# Patient Record
Sex: MALE | Race: WHITE | NOT HISPANIC OR LATINO | URBAN - METROPOLITAN AREA
[De-identification: names, ages, dates, MRNs, and addresses within clinical notes are randomized per-mention and may not be internally consistent; named-entity substitution may affect disease eponyms.]

---

## 2022-01-01 ENCOUNTER — INPATIENT (INPATIENT)
Facility: HOSPITAL | Age: 82
LOS: 0 days | DRG: 951 | End: 2022-11-06
Attending: STUDENT IN AN ORGANIZED HEALTH CARE EDUCATION/TRAINING PROGRAM | Admitting: STUDENT IN AN ORGANIZED HEALTH CARE EDUCATION/TRAINING PROGRAM
Payer: MEDICARE

## 2022-01-01 VITALS
OXYGEN SATURATION: 92 % | DIASTOLIC BLOOD PRESSURE: 66 MMHG | RESPIRATION RATE: 19 BRPM | HEART RATE: 58 BPM | TEMPERATURE: 99 F | SYSTOLIC BLOOD PRESSURE: 127 MMHG

## 2022-01-01 VITALS
HEIGHT: 67 IN | TEMPERATURE: 98 F | SYSTOLIC BLOOD PRESSURE: 121 MMHG | DIASTOLIC BLOOD PRESSURE: 89 MMHG | HEART RATE: 79 BPM

## 2022-01-01 DIAGNOSIS — N17.9 ACUTE KIDNEY FAILURE, UNSPECIFIED: ICD-10-CM

## 2022-01-01 DIAGNOSIS — I50.23 ACUTE ON CHRONIC SYSTOLIC (CONGESTIVE) HEART FAILURE: ICD-10-CM

## 2022-01-01 DIAGNOSIS — G93.41 METABOLIC ENCEPHALOPATHY: ICD-10-CM

## 2022-01-01 DIAGNOSIS — D62 ACUTE POSTHEMORRHAGIC ANEMIA: ICD-10-CM

## 2022-01-01 DIAGNOSIS — E87.20 ACIDOSIS, UNSPECIFIED: ICD-10-CM

## 2022-01-01 DIAGNOSIS — R77.8 OTHER SPECIFIED ABNORMALITIES OF PLASMA PROTEINS: ICD-10-CM

## 2022-01-01 DIAGNOSIS — Z29.9 ENCOUNTER FOR PROPHYLACTIC MEASURES, UNSPECIFIED: ICD-10-CM

## 2022-01-01 DIAGNOSIS — I50.22 CHRONIC SYSTOLIC (CONGESTIVE) HEART FAILURE: ICD-10-CM

## 2022-01-01 LAB
ABO RH CONFIRMATION: SIGNIFICANT CHANGE UP
ALBUMIN SERPL ELPH-MCNC: 3 G/DL — LOW (ref 3.3–5)
ALP SERPL-CCNC: 61 U/L — SIGNIFICANT CHANGE UP (ref 40–120)
ALT FLD-CCNC: 115 U/L — HIGH (ref 12–78)
ANION GAP SERPL CALC-SCNC: 18 MMOL/L — HIGH (ref 5–17)
APPEARANCE UR: ABNORMAL
APTT BLD: 31.8 SEC — SIGNIFICANT CHANGE UP (ref 27.5–35.5)
AST SERPL-CCNC: 130 U/L — HIGH (ref 15–37)
BASE EXCESS BLDA CALC-SCNC: -10.7 MMOL/L — LOW (ref -2–3)
BASOPHILS # BLD AUTO: 0 K/UL — SIGNIFICANT CHANGE UP (ref 0–0.2)
BASOPHILS NFR BLD AUTO: 0 % — SIGNIFICANT CHANGE UP (ref 0–2)
BILIRUB SERPL-MCNC: 0.9 MG/DL — SIGNIFICANT CHANGE UP (ref 0.2–1.2)
BILIRUB UR-MCNC: NEGATIVE — SIGNIFICANT CHANGE UP
BLOOD GAS COMMENTS ARTERIAL: SIGNIFICANT CHANGE UP
BUN SERPL-MCNC: 161 MG/DL — HIGH (ref 7–23)
CALCIUM SERPL-MCNC: 8.7 MG/DL — SIGNIFICANT CHANGE UP (ref 8.5–10.1)
CHLORIDE SERPL-SCNC: 101 MMOL/L — SIGNIFICANT CHANGE UP (ref 96–108)
CO2 SERPL-SCNC: 17 MMOL/L — LOW (ref 22–31)
COLOR SPEC: YELLOW — SIGNIFICANT CHANGE UP
CREAT SERPL-MCNC: 3.2 MG/DL — HIGH (ref 0.5–1.3)
DIFF PNL FLD: ABNORMAL
EGFR: 18 ML/MIN/1.73M2 — LOW
EOSINOPHIL # BLD AUTO: 0 K/UL — SIGNIFICANT CHANGE UP (ref 0–0.5)
EOSINOPHIL NFR BLD AUTO: 0 % — SIGNIFICANT CHANGE UP (ref 0–6)
GLUCOSE SERPL-MCNC: 99 MG/DL — SIGNIFICANT CHANGE UP (ref 70–99)
GLUCOSE UR QL: NEGATIVE — SIGNIFICANT CHANGE UP
HCO3 BLDA-SCNC: 15 MMOL/L — LOW (ref 21–28)
HCT VFR BLD CALC: 23.4 % — LOW (ref 39–50)
HGB BLD-MCNC: 7.4 G/DL — LOW (ref 13–17)
IMM GRANULOCYTES NFR BLD AUTO: 0.2 % — SIGNIFICANT CHANGE UP (ref 0–0.9)
INR BLD: 2.16 RATIO — HIGH (ref 0.88–1.16)
KETONES UR-MCNC: NEGATIVE — SIGNIFICANT CHANGE UP
LACTATE SERPL-SCNC: 8.2 MMOL/L — CRITICAL HIGH (ref 0.7–2)
LEUKOCYTE ESTERASE UR-ACNC: ABNORMAL
LYMPHOCYTES # BLD AUTO: 0.85 K/UL — LOW (ref 1–3.3)
LYMPHOCYTES # BLD AUTO: 17.7 % — SIGNIFICANT CHANGE UP (ref 13–44)
MCHC RBC-ENTMCNC: 31.6 GM/DL — LOW (ref 32–36)
MCHC RBC-ENTMCNC: 31.6 PG — SIGNIFICANT CHANGE UP (ref 27–34)
MCV RBC AUTO: 100 FL — SIGNIFICANT CHANGE UP (ref 80–100)
MONOCYTES # BLD AUTO: 0.11 K/UL — SIGNIFICANT CHANGE UP (ref 0–0.9)
MONOCYTES NFR BLD AUTO: 2.3 % — SIGNIFICANT CHANGE UP (ref 2–14)
NEUTROPHILS # BLD AUTO: 3.83 K/UL — SIGNIFICANT CHANGE UP (ref 1.8–7.4)
NEUTROPHILS NFR BLD AUTO: 79.8 % — HIGH (ref 43–77)
NITRITE UR-MCNC: NEGATIVE — SIGNIFICANT CHANGE UP
NRBC # BLD: 0 /100 WBCS — SIGNIFICANT CHANGE UP (ref 0–0)
NT-PROBNP SERPL-SCNC: HIGH PG/ML (ref 0–450)
OB PNL STL: POSITIVE
PCO2 BLDA: 27 MMHG — LOW (ref 35–48)
PH BLDA: 7.35 — SIGNIFICANT CHANGE UP (ref 7.35–7.45)
PH UR: 5 — SIGNIFICANT CHANGE UP (ref 5–8)
PLATELET # BLD AUTO: 213 K/UL — SIGNIFICANT CHANGE UP (ref 150–400)
PO2 BLDA: 117 MMHG — HIGH (ref 83–108)
POTASSIUM SERPL-MCNC: 5 MMOL/L — SIGNIFICANT CHANGE UP (ref 3.5–5.3)
POTASSIUM SERPL-SCNC: 5 MMOL/L — SIGNIFICANT CHANGE UP (ref 3.5–5.3)
PROT SERPL-MCNC: 6.6 G/DL — SIGNIFICANT CHANGE UP (ref 6–8.3)
PROT UR-MCNC: 100
PROTHROM AB SERPL-ACNC: 25.5 SEC — HIGH (ref 10.5–13.4)
RBC # BLD: 2.34 M/UL — LOW (ref 4.2–5.8)
RBC # FLD: 15.4 % — HIGH (ref 10.3–14.5)
SAO2 % BLDA: 100 % — HIGH (ref 94–98)
SARS-COV-2 RNA SPEC QL NAA+PROBE: SIGNIFICANT CHANGE UP
SODIUM SERPL-SCNC: 136 MMOL/L — SIGNIFICANT CHANGE UP (ref 135–145)
SP GR SPEC: 1.01 — SIGNIFICANT CHANGE UP (ref 1.01–1.02)
TROPONIN I, HIGH SENSITIVITY RESULT: 549.8 NG/L — HIGH
UROBILINOGEN FLD QL: NEGATIVE — SIGNIFICANT CHANGE UP
WBC # BLD: 4.8 K/UL — SIGNIFICANT CHANGE UP (ref 3.8–10.5)
WBC # FLD AUTO: 4.8 K/UL — SIGNIFICANT CHANGE UP (ref 3.8–10.5)

## 2022-01-01 PROCEDURE — 81001 URINALYSIS AUTO W/SCOPE: CPT

## 2022-01-01 PROCEDURE — 93010 ELECTROCARDIOGRAM REPORT: CPT

## 2022-01-01 PROCEDURE — 71250 CT THORAX DX C-: CPT | Mod: 26,MA

## 2022-01-01 PROCEDURE — 85025 COMPLETE CBC W/AUTO DIFF WBC: CPT

## 2022-01-01 PROCEDURE — 86900 BLOOD TYPING SEROLOGIC ABO: CPT

## 2022-01-01 PROCEDURE — 70450 CT HEAD/BRAIN W/O DYE: CPT | Mod: 26,MA

## 2022-01-01 PROCEDURE — 99497 ADVNCD CARE PLAN 30 MIN: CPT | Mod: 25

## 2022-01-01 PROCEDURE — 76770 US EXAM ABDO BACK WALL COMP: CPT | Mod: 26

## 2022-01-01 PROCEDURE — 87635 SARS-COV-2 COVID-19 AMP PRB: CPT

## 2022-01-01 PROCEDURE — 85610 PROTHROMBIN TIME: CPT

## 2022-01-01 PROCEDURE — 93005 ELECTROCARDIOGRAM TRACING: CPT

## 2022-01-01 PROCEDURE — 83880 ASSAY OF NATRIURETIC PEPTIDE: CPT

## 2022-01-01 PROCEDURE — 85730 THROMBOPLASTIN TIME PARTIAL: CPT

## 2022-01-01 PROCEDURE — 70450 CT HEAD/BRAIN W/O DYE: CPT | Mod: MA

## 2022-01-01 PROCEDURE — 99291 CRITICAL CARE FIRST HOUR: CPT | Mod: 25

## 2022-01-01 PROCEDURE — 76770 US EXAM ABDO BACK WALL COMP: CPT

## 2022-01-01 PROCEDURE — 71045 X-RAY EXAM CHEST 1 VIEW: CPT | Mod: 26

## 2022-01-01 PROCEDURE — 99223 1ST HOSP IP/OBS HIGH 75: CPT | Mod: AI

## 2022-01-01 PROCEDURE — 74176 CT ABD & PELVIS W/O CONTRAST: CPT | Mod: MA

## 2022-01-01 PROCEDURE — 36415 COLL VENOUS BLD VENIPUNCTURE: CPT

## 2022-01-01 PROCEDURE — 84484 ASSAY OF TROPONIN QUANT: CPT

## 2022-01-01 PROCEDURE — 74176 CT ABD & PELVIS W/O CONTRAST: CPT | Mod: 26,MA

## 2022-01-01 PROCEDURE — 86850 RBC ANTIBODY SCREEN: CPT

## 2022-01-01 PROCEDURE — 82803 BLOOD GASES ANY COMBINATION: CPT

## 2022-01-01 PROCEDURE — 82272 OCCULT BLD FECES 1-3 TESTS: CPT

## 2022-01-01 PROCEDURE — 86901 BLOOD TYPING SEROLOGIC RH(D): CPT

## 2022-01-01 PROCEDURE — 71045 X-RAY EXAM CHEST 1 VIEW: CPT

## 2022-01-01 PROCEDURE — 80053 COMPREHEN METABOLIC PANEL: CPT

## 2022-01-01 PROCEDURE — 87040 BLOOD CULTURE FOR BACTERIA: CPT

## 2022-01-01 PROCEDURE — 83605 ASSAY OF LACTIC ACID: CPT

## 2022-01-01 PROCEDURE — 99291 CRITICAL CARE FIRST HOUR: CPT

## 2022-01-01 PROCEDURE — 71250 CT THORAX DX C-: CPT | Mod: MA

## 2022-01-01 RX ORDER — FONDAPARINUX SODIUM 2.5 MG/.5ML
1 INJECTION, SOLUTION SUBCUTANEOUS
Qty: 0 | Refills: 0 | DISCHARGE

## 2022-01-01 RX ORDER — MORPHINE SULFATE 50 MG/1
5 CAPSULE, EXTENDED RELEASE ORAL
Refills: 0 | Status: DISCONTINUED | OUTPATIENT
Start: 2022-01-01 | End: 2022-01-01

## 2022-01-01 RX ORDER — HYDROMORPHONE HYDROCHLORIDE 2 MG/ML
0.5 INJECTION INTRAMUSCULAR; INTRAVENOUS; SUBCUTANEOUS
Refills: 0 | Status: DISCONTINUED | OUTPATIENT
Start: 2022-01-01 | End: 2022-01-01

## 2022-01-01 RX ORDER — METOPROLOL TARTRATE 50 MG
50 TABLET ORAL
Refills: 0 | Status: DISCONTINUED | OUTPATIENT
Start: 2022-01-01 | End: 2022-01-01

## 2022-01-01 RX ORDER — SODIUM BICARBONATE 1 MEQ/ML
0.05 SYRINGE (ML) INTRAVENOUS
Qty: 75 | Refills: 0 | Status: DISCONTINUED | OUTPATIENT
Start: 2022-01-01 | End: 2022-01-01

## 2022-01-01 RX ORDER — METOPROLOL TARTRATE 50 MG
1 TABLET ORAL
Qty: 0 | Refills: 0 | DISCHARGE

## 2022-01-01 RX ORDER — ONDANSETRON 8 MG/1
4 TABLET, FILM COATED ORAL EVERY 8 HOURS
Refills: 0 | Status: DISCONTINUED | OUTPATIENT
Start: 2022-01-01 | End: 2022-01-01

## 2022-01-01 RX ORDER — LANOLIN ALCOHOL/MO/W.PET/CERES
3 CREAM (GRAM) TOPICAL AT BEDTIME
Refills: 0 | Status: DISCONTINUED | OUTPATIENT
Start: 2022-01-01 | End: 2022-01-01

## 2022-01-01 RX ORDER — ATORVASTATIN CALCIUM 80 MG/1
20 TABLET, FILM COATED ORAL AT BEDTIME
Refills: 0 | Status: DISCONTINUED | OUTPATIENT
Start: 2022-01-01 | End: 2022-01-01

## 2022-01-01 RX ORDER — ATORVASTATIN CALCIUM 80 MG/1
1 TABLET, FILM COATED ORAL
Qty: 0 | Refills: 0 | DISCHARGE

## 2022-01-01 RX ORDER — SODIUM BICARBONATE 1 MEQ/ML
100 SYRINGE (ML) INTRAVENOUS ONCE
Refills: 0 | Status: COMPLETED | OUTPATIENT
Start: 2022-01-01 | End: 2022-01-01

## 2022-01-01 RX ADMIN — Medication 100 MILLIEQUIVALENT(S): at 04:15

## 2022-01-01 RX ADMIN — ONDANSETRON 4 MILLIGRAM(S): 8 TABLET, FILM COATED ORAL at 03:03

## 2022-11-05 NOTE — ED ADULT TRIAGE NOTE - HAVE YOU RECEIVED AT LEAST TWO PFIZER AND/OR MODERNA VACCINATIONS (IN ANY COMBINATION) AND/OR ONE JOHNSON & JOHNSON VACCINATION?
Neuro: A&Ox4. Calls appropriately.   Activity: Up with Ax2 and lift. Q2H repo. Pt sat up in recliner.    Vitals: Afebrile. VSS on 4L, afib 100s-120s.     LDAS: Right DL PICC heparin locked. Left PIV saline locked. Baig catheter.   Cardiac: Afib with frequent PACs/PVCs.    Respiratory: Lung sounds clear, diminished at the bases. 4L O2 via NC, BiPAP at 40% FiO2 when sleeping.  GI/: Baig catheter in place. BM this shift,   Skin:  Interdry and miconazole powder between abdominal skin folds. Preventative mepilex covering coccyx.  Pain: C/o generalized achy pain.   Diet: Regular.   Plan: Continue to monitor and follow POC.       Yes

## 2022-11-05 NOTE — ED PROVIDER NOTE - CARE PLAN
Principal Discharge DX:	Acute on chronic systolic congestive heart failure  Secondary Diagnosis:	Anemia due to acute blood loss   1

## 2022-11-05 NOTE — ED PROVIDER NOTE - PROGRESS NOTE DETAILS
CT images reviewed by me. CT head no acute bleed. CT chest B/L pleural effusions worse on the left (as per family, pt has had this before), CT A/P markedly distended urinary bladder.   Family at bedside informed of prelim findings. RN advised to place farmer cath. Labs pending. Labs reviewed.  Likely acute anemia hemoglobin is 7.4.  Worsening renal function creatinine 3.2 family reports baseline is 2.2.0.  Also elevated troponins of 549.  Markedly elevated lactic acid of 8.0.  Family informed of all results.  Advise unit of blood transfusion.  Family agreed and consented for blood transfusion.  Case discussed with Dr. Aguilar advised ICU consult.  Spoke with ICU PA will evaluate patient.  Family is deciding on making patient a DNR.  We will sign MOLST forms once they have made their decision. Pt made DNI/DNR by family.  ICU advised comfort care.  Case d/w Dr. Aguilar for admisison

## 2022-11-05 NOTE — ED ADULT NURSE NOTE - OBJECTIVE STATEMENT
83 y/o male received from triage. Alert and oriented x4. C/o hypotension, low spo2, and weakness. Respirations even and unlabored. Neuro intact, no facial droop noted.

## 2022-11-05 NOTE — ED ADULT TRIAGE NOTE - CHIEF COMPLAINT QUOTE
Patient from home complaining of hypotension, low sp02, decreased eating/drinking and increased weakness, with recently today black tarry stools as per family with recent fall in bathroom, denies LoC, no noticeable trauma to head. Patient has pacemaker and defib to left chest wall.

## 2022-11-05 NOTE — ED PROVIDER NOTE - NSICDXPASTMEDICALHX_GEN_ALL_CORE_FT
PAST MEDICAL HISTORY:  BPH (Benign Prostatic Hyperplasia)     CAD (coronary artery disease) dx 1992  angioplasty in 1992, angioplasty with 1 stent placed 1995, CABG x 5 2004    Herpes zoster left eye 1/13 resolved    HTN (hypertension)     Hyperlipidemia     Kidney disease

## 2022-11-05 NOTE — ED PROVIDER NOTE - CLINICAL SUMMARY MEDICAL DECISION MAKING FREE TEXT BOX
82-year-old male with history of hypertension, congestive heart failure, CAD, AICD presents for increasing lower extremity swelling patient becoming more somnolent and diminished appetite along with black tarry stools.  Evaluate for worsening CHF, ACS, worsening renal function, acute anemia secondary to acute GI bleed as patient is on Xarelto, sepsis, COVID.  Plan to get labs, EKG, CT head and CT abdomen and pelvis without contrast due to CKD and patient would not tolerate oral contrast due to somnolence.  EKG is a paced ventricular rhythm at 73 bpm.  Patient will likely require admission.

## 2022-11-05 NOTE — ED PROVIDER NOTE - OBJECTIVE STATEMENT
82-year-old male with history of hypertension, CHF with an EF approximately 25% as per family, hyperlipidemia brought in by family for complaints of increasing lower extremity swelling, patient feeling weak with diminished appetite from baseline over the last few days and especially today.  Son also reports patient had black tarry stools when he cleaned him today and yesterday. BMs were small. Unsure of urinary output as pt wears diapers but no hematuria reported.  Patient was discharged from [facility] on 10/28/2022 where he was there for elevated troponin and worsening kidney function.  As per family patient was admitted there for approximately 1 week troponins trended down and kidney function improved patient was discharged home.  For the first few days patient had better appetite and was more alert but again similar symptoms began to develop.  Patient is also on Xarelto.  Patient appears somnolent but in no distress but awakens when called his name and does not report any pain.    Patient's primary care doctor is Dr. Hernandez and his cardiologist is Dr. Mireles with [facility] cardiology group.

## 2022-11-06 NOTE — CONSULT NOTE ADULT - SUBJECTIVE AND OBJECTIVE BOX
Date/Time Patient Seen:  		  Referring MD:   Data Reviewed	       Patient is a 82y old  Male who presents with a chief complaint of Lactic acidosis, ABLA (06 Nov 2022 01:49)      Subjective/HPI  vs noted  labs reviewed  ER provider note reviewed  H and P reviewed  Imaging reviewed  old records reviewed  [facility] documented     History of Present Illness:   82-year-old male with history of HTN, HFrEF (EF 20-25% as per family), HLD, CKD3 (baseline SCr ~+2.5 per family) brought in by family for complaints of weakness, dark stools and decreased appetite. Patient feeling weak with diminished appetite from baseline over the last few days and especially today.  Son also reports patient had black tarry stools when he cleaned him today and yesterday. BMs were small. Unsure of urinary output as pt wears diapers but no hematuria reported.  Patient was discharged from [facility] on 10/28/2022 where he was there for elevated troponin and worsening kidney function.  As per family patient was admitted there for approximately 1 week troponins trended down and kidney function improved patient was discharged home.  For the first few days patient had better appetite and was more alert but again similar symptoms began to develop. Patient is also on Xarelto (did not take yesterday).      PAST MEDICAL & SURGICAL HISTORY:  CAD (coronary artery disease)  dx 1992  angioplasty in 1992, angioplasty with 1 stent placed 1995, CABG x 5 2004    HTN (hypertension)    Hyperlipidemia    BPH (Benign Prostatic Hyperplasia)    Kidney disease    Herpes zoster  left eye 1/13 resolved    S/P CABG x 5  2004    S/P arthroscopy  right shoulder      PAST SURGICAL HISTORY:  S/P arthroscopy right shoulder    S/P CABG x 5 2004.     FAMILY HISTORY:  No pertinent family history in first degree relatives. No pertinent family history of: diabetes.     Social History:  · Substance use	No   · Social History (marital status, living situation, occupation, and sexual history)	lives with wife  denies smoking, drinking, etoh use   dnr/dni     Tobacco Screening:  · Core Measure Site	Yes  · Has the patient used tobacco in the past 30 days?	No     Risk Assessment:    Present on Admission:  Deep Venous Thrombosis	no   Pulmonary Embolus	no      HIV Screening:  · In accordance with [place] State law, we offer every patient who comes to our ED an HIV test. Would you like to be tested today?	Unable to answer due to medical condition/unresponsive/etc...         Medication list         MEDICATIONS  (STANDING):  atorvastatin 20 milliGRAM(s) Oral at bedtime  metoprolol succinate ER 50 milliGRAM(s) Oral two times a day    MEDICATIONS  (PRN):  melatonin 3 milliGRAM(s) Oral at bedtime PRN Insomnia  ondansetron Injectable 4 milliGRAM(s) IV Push every 8 hours PRN Nausea and/or Vomiting         Vitals log        ICU Vital Signs Last 24 Hrs  T(C): --  T(F): --  HR: 79 (05 Nov 2022 21:41) (79 - 79)  BP: 121/89 (05 Nov 2022 21:41) (121/89 - 121/89)  BP(mean): --  ABP: --  ABP(mean): --  RR: --  SpO2: --    O2 Parameters below as of 05 Nov 2022 21:41  Patient On (Oxygen Delivery Method): room air                 Input and Output:  I&O's Detail      Lab Data                        7.4    4.80  )-----------( 213      ( 05 Nov 2022 22:10 )             23.4     11-05    136  |  101  |  161<H>  ----------------------------<  99  5.0   |  17<L>  |  3.20<H>    Ca    8.7      05 Nov 2022 22:10    TPro  6.6  /  Alb  3.0<L>  /  TBili  0.9  /  DBili  x   /  AST  130<H>  /  ALT  115<H>  /  AlkPhos  61  11-05    ABG - ( 06 Nov 2022 00:48 )  pH, Arterial: 7.35  pH, Blood: x     /  pCO2: 27    /  pO2: 117   / HCO3: 15    / Base Excess: -10.7 /  SaO2: 100.0                   Review of Systems	  weakness      Objective     Physical Examination        Pertinent Lab findings & Imaging      Franco:  NO   Adequate UO     I&O's Detail           Discussed with:     Cultures:	        Radiology    ACC: [id number] EXAM:  CT CHEST                          PROCEDURE DATE:  11/05/2022          INTERPRETATION:  CLINICAL INFORMATION: Pneumonia CHF with ejection   fraction 25%. Increased lower extremity swelling and weakness.    COMPARISON: None.    CONTRAST/COMPLICATIONS:  IV Contrast: NONE  Oral Contrast: NONE  Complications: None reported at time of study completion    PROCEDURE:  CT of the Chest was performed.  Sagittal and coronal reformats were performed.    FINDINGS:    LUNGS, AIRWAYS AND PLEURA: The central tracheobronchial tree is patent.   There are moderate to large bilateral pleural effusions, with associated   compressive atelectasis. Additional areas of linear atelectasis or   scarring is seen left lower lobe and right middle lobe..  MEDIASTINUM AND KAREN: Nonenlarged mediastinal lymph nodes are noted..  VESSELS: Aorta pulmonary artery are of normal caliber. Calcifications of   the aorta and branch vessels are noted. Coronary calcifications are also   appreciated..  HEART: Heart size is enlarged. There is a triple lead intracardiac device   noted. A generator seen within the left anterior chest wall. An aortic   valve prosthesis is noted. Multiple mediastinal surgical clips are   present. No pericardial effusion.  CHEST WALL AND LOWER NECK: There is generalized soft tissue edema..  VISUALIZED UPPER ABDOMEN: Within normal limits.  BONES: Within normal limits.    IMPRESSION:  Cardiomegaly and moderate to large bilateral pleural effusions compatible   with history of CHF.    No pneumonia identified.        --- End of Report ---             JASON VALENTE M.D., Attending Radiologist  This document has been electronically signed. Nov 5 2022 11:42PM                           Date/Time Patient Seen:  		  Referring MD:   Data Reviewed	       Patient is a 82y old  Male who presents with a chief complaint of Lactic acidosis, ABLA (06 Nov 2022 01:49)      Subjective/HPI  vs noted  labs reviewed  ER provider note reviewed  H and P reviewed  Imaging reviewed  old records reviewed  [facility] documented     History of Present Illness:   82-year-old male with history of HTN, HFrEF (EF 20-25% as per family), HLD, CKD3 (baseline SCr ~+2.5 per family) brought in by family for complaints of weakness, dark stools and decreased appetite. Patient feeling weak with diminished appetite from baseline over the last few days and especially today.  Son also reports patient had black tarry stools when he cleaned him today and yesterday. BMs were small. Unsure of urinary output as pt wears diapers but no hematuria reported.  Patient was discharged from [facility] on 10/28/2022 where he was there for elevated troponin and worsening kidney function.  As per family patient was admitted there for approximately 1 week troponins trended down and kidney function improved patient was discharged home.  For the first few days patient had better appetite and was more alert but again similar symptoms began to develop. Patient is also on Xarelto (did not take yesterday).      PAST MEDICAL & SURGICAL HISTORY:  CAD (coronary artery disease)  dx 1992  angioplasty in 1992, angioplasty with 1 stent placed 1995, CABG x 5 2004    HTN (hypertension)    Hyperlipidemia    BPH (Benign Prostatic Hyperplasia)    Kidney disease    Herpes zoster  left eye 1/13 resolved    S/P CABG x 5  2004    S/P arthroscopy  right shoulder      PAST SURGICAL HISTORY:  S/P arthroscopy right shoulder    S/P CABG x 5 2004.     FAMILY HISTORY:  No pertinent family history in first degree relatives. No pertinent family history of: diabetes.     Social History:  · Substance use	No   · Social History (marital status, living situation, occupation, and sexual history)	lives with wife  denies smoking, drinking, etoh use   dnr/dni     Tobacco Screening:  · Core Measure Site	Yes  · Has the patient used tobacco in the past 30 days?	No     Risk Assessment:    Present on Admission:  Deep Venous Thrombosis	no   Pulmonary Embolus	no      HIV Screening:  · In accordance with [place] State law, we offer every patient who comes to our ED an HIV test. Would you like to be tested today?	Unable to answer due to medical condition/unresponsive/etc...         Medication list         MEDICATIONS  (STANDING):  atorvastatin 20 milliGRAM(s) Oral at bedtime  metoprolol succinate ER 50 milliGRAM(s) Oral two times a day    MEDICATIONS  (PRN):  melatonin 3 milliGRAM(s) Oral at bedtime PRN Insomnia  ondansetron Injectable 4 milliGRAM(s) IV Push every 8 hours PRN Nausea and/or Vomiting         Vitals log        ICU Vital Signs Last 24 Hrs  T(C): --  T(F): --  HR: 79 (05 Nov 2022 21:41) (79 - 79)  BP: 121/89 (05 Nov 2022 21:41) (121/89 - 121/89)  BP(mean): --  ABP: --  ABP(mean): --  RR: --  SpO2: --    O2 Parameters below as of 05 Nov 2022 21:41  Patient On (Oxygen Delivery Method): room air                 Input and Output:  I&O's Detail      Lab Data                        7.4    4.80  )-----------( 213      ( 05 Nov 2022 22:10 )             23.4     11-05    136  |  101  |  161<H>  ----------------------------<  99  5.0   |  17<L>  |  3.20<H>    Ca    8.7      05 Nov 2022 22:10    TPro  6.6  /  Alb  3.0<L>  /  TBili  0.9  /  DBili  x   /  AST  130<H>  /  ALT  115<H>  /  AlkPhos  61  11-05    ABG - ( 06 Nov 2022 00:48 )  pH, Arterial: 7.35  pH, Blood: x     /  pCO2: 27    /  pO2: 117   / HCO3: 15    / Base Excess: -10.7 /  SaO2: 100.0                   Review of Systems	  weakness      Objective     Physical Examination    heart s1s2  lung dc BS  head nc  frail  weak      Pertinent Lab findings & Imaging      Franco:  NO   Adequate UO     I&O's Detail           Discussed with:     Cultures:	        Radiology    ACC: [id number] EXAM:  CT CHEST                          PROCEDURE DATE:  11/05/2022          INTERPRETATION:  CLINICAL INFORMATION: Pneumonia CHF with ejection   fraction 25%. Increased lower extremity swelling and weakness.    COMPARISON: None.    CONTRAST/COMPLICATIONS:  IV Contrast: NONE  Oral Contrast: NONE  Complications: None reported at time of study completion    PROCEDURE:  CT of the Chest was performed.  Sagittal and coronal reformats were performed.    FINDINGS:    LUNGS, AIRWAYS AND PLEURA: The central tracheobronchial tree is patent.   There are moderate to large bilateral pleural effusions, with associated   compressive atelectasis. Additional areas of linear atelectasis or   scarring is seen left lower lobe and right middle lobe..  MEDIASTINUM AND KAREN: Nonenlarged mediastinal lymph nodes are noted..  VESSELS: Aorta pulmonary artery are of normal caliber. Calcifications of   the aorta and branch vessels are noted. Coronary calcifications are also   appreciated..  HEART: Heart size is enlarged. There is a triple lead intracardiac device   noted. A generator seen within the left anterior chest wall. An aortic   valve prosthesis is noted. Multiple mediastinal surgical clips are   present. No pericardial effusion.  CHEST WALL AND LOWER NECK: There is generalized soft tissue edema..  VISUALIZED UPPER ABDOMEN: Within normal limits.  BONES: Within normal limits.    IMPRESSION:  Cardiomegaly and moderate to large bilateral pleural effusions compatible   with history of CHF.    No pneumonia identified.        --- End of Report ---             JASON VALENTE M.D., Attending Radiologist  This document has been electronically signed. Nov 5 2022 11:42PM

## 2022-11-06 NOTE — H&P ADULT - HISTORY OF PRESENT ILLNESS
82-year-old male with history of HTN, HFrEF (EF 20-25% as per family), HLD, CKD3 (baseline SCr ~+2.5 per family) brought in by family for complaints of weakness, dark stools and decreased appetite. Patient feeling weak with diminished appetite from baseline over the last few days and especially today.  Son also reports patient had black tarry stools when he cleaned him today and yesterday. BMs were small. Unsure of urinary output as pt wears diapers but no hematuria reported.  Patient was discharged from [facility] on 10/28/2022 where he was there for elevated troponin and worsening kidney function.  As per family patient was admitted there for approximately 1 week troponins trended down and kidney function improved patient was discharged home.  For the first few days patient had better appetite and was more alert but again similar symptoms began to develop. Patient is also on Xarelto (did not take yesterday).      In ER, labs significant for Hgb 7.4, BUN/SCr 161/3.20, Lactate 8.0, Troponin 549.8, proBNP [id number]. Fecal occult positive. UA+. CT Chest w/ b/l pleural effusion, no active bleed on CT A/P noncontrast. MICU consulted for further management.  Pt seen and examined at bedside - Lethargic appearing however responsive and AAOx3 (name, , place). Asymptomatic at this time. Afebrile, hemodynamics stable. Satting well on RA.    82-year-old male with history of HTN, HFrEF (EF 20-25% as per family), HLD, CKD3 (baseline SCr ~+2.5 per family) brought in by family for complaints of weakness, dark stools and decreased appetite. Patient feeling weak with diminished appetite from baseline over the last few days and especially today.  Son also reports patient had black tarry stools when he cleaned him today and yesterday. BMs were small. Unsure of urinary output as pt wears diapers but no hematuria reported.  Patient was discharged from [facility] on 10/28/2022 where he was there for elevated troponin and worsening kidney function.  As per family patient was admitted there for approximately 1 week troponins trended down and kidney function improved patient was discharged home.  For the first few days patient had better appetite and was more alert but again similar symptoms began to develop. Patient is also on Xarelto (did not take yesterday).      In ER, labs significant for Hgb 7.4, BUN/SCr 161/3.20, Lactate 8.0, Troponin 549.8, proBNP [id number]. Fecal occult positive. UA+. CT Chest w/ b/l pleural effusion, no active bleed on CT A/P noncontrast. MICU consulted- not a candidate for icu at this time

## 2022-11-06 NOTE — ED ADULT NURSE REASSESSMENT NOTE - NS ED NURSE REASSESS COMMENT FT1
1130:  [facility]  [facility]  aliya tom.

## 2022-11-06 NOTE — ED ADULT NURSE REASSESSMENT NOTE - NS ED NURSE REASSESS COMMENT FT1
0719:  received patient.  he appears to be resting comfortably at this time.  family member at bedside.  awaiting hospital bed for comfort.  Explained to the family member that we have orders for pain / agitation available for him.  Family member states he is comfortable at this time.  respirations even / unlabored.  no apparent distress.  Patient is to become Hospice care, DNR/DNI.  will continue to monitor.  aliya fierro.

## 2022-11-06 NOTE — PROVIDER CONTACT NOTE (EICU) - ASSESSMENT
Problem list  1. Loss of appetite was triggered by uremic encephalopathy  2. Worsening CKD indicates pump (heart) failure  3. GIB

## 2022-11-06 NOTE — H&P ADULT - CONVERSATION DETAILS
Writer met with patient wife, son Sathish(HCP), abdirahman Mancia (RN) and several other family members. ICU PA also present for conversation. Reviewed patient's medical and social history as well as events leading to patient's hospitalization. Writer discussed patient's current diagnosis (ABLA, Lactic acidosis, CHF(EF 25), MARIANNA on CKD, uremic encephalopathy, GIB),  medical condition and management,   prognosis, and life expectancy. Inquired about patient's wishes regarding extent of medical care to be provided including escalation of medical care into the ICU and use of vasopressor support. In addition, the writer inquired about thoughts regarding cardiopulmnary resuscitation, artificial nutrition and hydration including use of feeding tubes and IVF, antibiotics, and further investigative studies such as blood draws and radiology. Family showed good insight into medical condition. All questions answered. Writer recommended MOLST.Family consented to DNR/DNI status. Family understands poor prognosis given underlying comorbidities and would not want to put patient through any invasive procedures or trauma. They stated they would like to treat w/ conservative measures (IVF, abx, blood products if needed), however if patient were to further decompensate they/he would not want him to be intubated or have CPR performed; they also stated that if he were to need surgical intervention, they know he would most-likely not survive and therefore would not pursue surgery. MOLST form filled out reflecting updated DNR/DNI status.     Family currently states they would like to hold off on blood products at this time given risk of fluid overload state and would like to reevaluate need for blood in early AM. At that time if blood products are required they would like half units to be administered as slowly as possible.     Family will further discuss GOC this evening. Requesting to speak with Dr. Pitts in the am to further discuss. At that time they may consider comfort measures.     Spouse requests son Sathish be called with updates as well as herself () please involve spouse, relative Gavino [phone]) and son in any further GOC discussions     Time spent 30 minutes

## 2022-11-06 NOTE — ED ADULT NURSE REASSESSMENT NOTE - NS ED NURSE REASSESS COMMENT FT1
1001:  family called me into room.  patient not breathing.  assessed patient, no hr,  no respirations.  called resident down to pronounce.  aliya fierro.

## 2022-11-06 NOTE — DISCHARGE NOTE FOR THE EXPIRED PATIENT - HOSPITAL COURSE
82-year-old male with history of HTN, HFrEF (EF 20-25% as per family), HLD, CKD3 (baseline SCr ~+2.5 per family) brought in by family for complaints of weakness, dark stools and decreased appetite, patient admitted with acute blood loss anemia, GIB, MARIANNA on CKD, lactic acidosis. GOC discussion with family at bedside with Dr. Pitts, family opted for comfort measures and hospice. Notified by RN at 10:05 that patient had no spontaneous respirations or pulse. Patient pronounced dead at 10:10. Family aware at bedside.

## 2022-11-06 NOTE — CHART NOTE - NSCHARTNOTEFT_GEN_A_CORE
Called by RN as patient appears to be no longer breathing. Patient seen and examined at bedside. Patient did not respond to verbal, physical, or painful stimuli. Absent heart and breath sounds on auscultation. Pupils are fixed and dilated, absent corneal reflex. No palpable pulses at radial, carotid, or femoral arteries.    Time of Death: 10:10  Call placed to Dr. Mario Carey at bedside.

## 2022-11-06 NOTE — H&P ADULT - NSHPPHYSICALEXAM_GEN_ALL_CORE
T(C): --  HR: 79 (11-05-22 @ 21:41) (79 - 79)  BP: 121/89 (11-05-22 @ 21:41) (121/89 - 121/89)  RR: --  SpO2: --    GENERAL: pale, lethargic but responds to verbal stimuli   EYES: sclera clear, no exudates  ENMT: oropharynx clear without erythema, no exudates, moist mucous membranes  NECK: supple, soft, no thyromegaly noted  LUNGS: diminished at bases b/l   HEART: soft S1/S2, regular rate and rhythm, no murmurs noted, no lower extremity edema  GASTROINTESTINAL: abdomen is soft, nontender, nondistended, normoactive bowel sounds, no palpable masses  INTEGUMENT: good skin turgor, no lesions noted  MUSCULOSKELETAL: 2+ pitting edema b/l le   NEUROLOGIC: awake, alert, oriented x2 (does not know year), good muscle tone in 4 extremities, no obvious sensory deficits

## 2022-11-06 NOTE — PROVIDER CONTACT NOTE (EICU) - RECOMMENDATIONS
Plan  1. Will not offer dialysis, patient is actively dying, and without HD, he may live for another 7-10 days  2. Would not start inotropic support, since there is no bridge for any vital treatment, e.g. mechanical devices  3. Focus on comfort care  4. Can slowly transfuse 2 PRBC at 250cc/hr with 80mg IV lasix in between

## 2022-11-06 NOTE — H&P ADULT - PROBLEM SELECTOR PLAN 3
troponin 549.8   likely demand in the setting of above   EKG with no ischemic changes   trend to peak   cardio Dr. Prieto consulted

## 2022-11-06 NOTE — H&P ADULT - PROBLEM SELECTOR PLAN 6
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

## 2022-11-06 NOTE — CONSULT NOTE ADULT - ASSESSMENT
82-year-old male with history of HTN, HFrEF (EF 20-25% as per family), HLD, CKD3 (baseline SCr ~+2.5 per family) brought in by family for complaints of weakness, dark stools and decreased appetite. Admit with ABLA, GIB, MARIANNA on CKD, lactic acidosis.       ams  TME  anemia  CKD  MARIANNA  OP  OA  CHF  HTN   82-year-old male with history of HTN, HFrEF (EF 20-25% as per family), HLD, CKD3 (baseline SCr ~+2.5 per family) brought in by family for complaints of weakness, dark stools and decreased appetite. Admit with ABLA, GIB, MARIANNA on CKD, lactic acidosis.       ams  TME  anemia  CKD  MARIANNA  OP  OA  CHF  HTN    retired   lives with family  has 2 sons  spoke with son and daughter in law  family is interested in Home Hospice evaluation and placement  CT reviewed  old records reviewed  renal evaluation noted  family does not want aggressive measures - modified comfort care    modified palliative - comfort care  ok with home meds  does not want IVF - PRBC - Tests - Labs - Procedures -   prn anxiolytics   prn pain rx regimen

## 2022-11-06 NOTE — CONSULT NOTE ADULT - SUBJECTIVE AND OBJECTIVE BOX
Patient is a 82y old  Male who presents with a chief complaint of     BRIEF HOSPITAL COURSE:   82-year-old male with history of HTN, HFrEF (EF 20-25% as per family), HLD, CKD3 (baseline SCr ~+2.5 per family) brought in by family for complaints of increasing lower extremity swelling, patient feeling weak with diminished appetite from baseline over the last few days and especially today.  Son also reports patient had black tarry stools when he cleaned him today and yesterday. BMs were small. Unsure of urinary output as pt wears diapers but no hematuria reported.  Patient was discharged from [facility] on 10/28/2022 where he was there for elevated troponin and worsening kidney function.  As per family patient was admitted there for approximately 1 week troponins trended down and kidney function improved patient was discharged home.  For the first few days patient had better appetite and was more alert but again similar symptoms began to develop. Patient is also on Xarelto (did not take yesterday).      In ER, labs significant for Hgb 7.4, BUN/SCr 161/3.20, Lactate 8.0, Troponin 549.8, proBNP [id number]. Fecal occult positive. UA+. CT Chest w/ b/l pleural effusion, no active bleed on CT A/P noncontrast. MICU consulted for further management.  Pt seen and examined at bedside - Lethargic appearing however responsive and AAOx3 (name, , place). Asymptomatic at this time. Afebrile, hemodynamics stable. Satting well on RA.     GOC discussion held w/ patient's son's/HCP and family members, as well as hospitalist Dr. Aguilar at bedside. Family understands poor prognosis given underlying comorbidities (rEF and CKD) and would not want to put patient through any invasive procedures or trauma. They stated they would like to treat w/ conservative measures (IVF, abx, blood products if needed), however if patient were to further decompensate they/he would not want him to be intubated or have CPR performed; they also stated that if he were to need surgical intervention, they know he would most-likely not survive and therefore would not pursue surgery. MOLST form filled out reflecting updated DNR/DNI status.     PAST MEDICAL & SURGICAL HISTORY:  CAD (coronary artery disease)  dx   angioplasty in , angioplasty with 1 stent placed , CABG x 5   HTN (hypertension)  Hyperlipidemia  BPH (Benign Prostatic Hyperplasia)  Kidney disease  Herpes zoster  left eye  resolved  S/P CABG x 5    S/P arthroscopy  right shoulder    Review of Systems:  CONSTITUTIONAL: No fever, chills, or fatigue  EYES: No eye pain, visual disturbances, or discharge  ENMT:  No difficulty hearing, tinnitus, vertigo; No sinus or throat pain  NECK: No pain or stiffness  RESPIRATORY: No cough, wheezing, chills or hemoptysis; No shortness of breath  CARDIOVASCULAR: No chest pain, palpitations, dizziness, or leg swelling  GASTROINTESTINAL: No abdominal or epigastric pain. No nausea, vomiting, or hematemesis; No diarrhea or constipation. No melena or hematochezia.  GENITOURINARY: No dysuria, frequency, hematuria, or incontinence  NEUROLOGICAL: No headaches, memory loss, loss of strength, numbness, or tremors  SKIN: No itching, burning, rashes, or lesions   MUSCULOSKELETAL: No joint pain or swelling; No muscle, back, or extremity pain  PSYCHIATRIC: No depression, anxiety, mood swings, or difficulty sleeping    Medications:    ICU Vital Signs Last 24 Hrs  T(C): --  T(F): --  HR: 79 (2022 21:41) (79 - 79)  BP: 121/89 (2022 21:41) (121/89 - 121/89)  BP(mean): --  ABP: --  ABP(mean): --  RR: --  SpO2: --  O2 Parameters below as of 2022 21:41  Patient On (Oxygen Delivery Method): room air    ABG - ( 2022 00:48 )  pH, Arterial: 7.35  pH, Blood: x     /  pCO2: 27    /  pO2: 117   / HCO3: 15    / Base Excess: -10.7 /  SaO2: 100.0     I&O's Detail    LABS:                      7.4    4.80  )-----------( 213      ( 2022 22:10 )             23.4     11  136  |  101  |  161<H>  ----------------------------<  99  5.0   |  17<L>  |  3.20<H>  Ca    8.7      2022 22:10  TPro  6.6  /  Alb  3.0<L>  /  TBili  0.9  /  DBili  x   /  AST  130<H>  /  ALT  115<H>  /  AlkPhos  61  11-05    CAPILLARY BLOOD GLUCOSE    PT/INR - ( 2022 22:10 )   PT: 25.5 sec;   INR: 2.16 ratio     PTT - ( 2022 22:10 )  PTT:31.8 sec    Urinalysis Basic - ( 2022 23:15)  Color: Yellow / Appearance: Slightly Turbid / S.015 / pH: x  Gluc: x / Ketone: Negative  / Bili: Negative / Urobili: Negative   Blood: x / Protein: 100 / Nitrite: Negative   Leuk Esterase: Trace / RBC: 25-50 /HPF / WBC 3-5   Sq Epi: x / Non Sq Epi: Occasional / Bacteria: Occasional      CULTURES:    Physical Examination:  General: Lethargic appearing, however arousable and mentating appropriately.   HEENT: PERRL.  NECK: Supple.   PULM: Diminished BS at bases b/l.   CVS: s1/s2.  ABD: Soft, nondistended, nontender, normoactive bowel sounds.  EXT: b/l LE edema, nontender.  SKIN: Warm.    RADIOLOGY:   < from: CT Chest No Cont (22 @ 22:31) >  ACC: [id number] EXAM:  CT CHEST                        PROCEDURE DATE:  2022    IMPRESSION:  Cardiomegaly and moderate to large bilateral pleural effusions compatible   with history of CHF.  No pneumonia identified.    < from: CT Abdomen and Pelvis No Cont (22 @ 22:27) >  ACC: [id number] EXAM:  CT ABDOMEN AND PELVIS                        PROCEDURE DATE:  2022    IMPRESSION:  Markedly dilated urinary bladder. This may be related to sequelae of   outlet obstruction from prostate enlargement versus representing sequelae   of urinary retention. Correlate clinically.  Hyperdense material seen within the right renal calyces. This may be   related to blood products, though the possibility of urothelial lesion   cannot be excluded.  Mild left-sided hydronephrosis, likely related to degree of bladder   distention.  Possibility of mild UPJ obstruction not excluded.  Large bilateral pleural effusions and cardiomegaly.        82-year-old male with history of HTN, HFrEF (EF 20-25% as per family), HLD, CKD3 (baseline SCr ~+2.5 per family) brought in by family for complaints of increasing lower extremity swelling, patient feeling weak with diminished appetite from baseline over the last few days and especially today.  Son also reports patient had black tarry stools when he cleaned him today and yesterday. BMs were small. Unsure of urinary output as pt wears diapers but no hematuria reported.  Patient was discharged from [facility] on 10/28/2022 where he was there for elevated troponin and worsening kidney function.  As per family patient was admitted there for approximately 1 week troponins trended down and kidney function improved patient was discharged home.  For the first few days patient had better appetite and was more alert but again similar symptoms began to develop. Patient is also on Xarelto (did not take yesterday).    In ER, labs significant for Hgb 7.4, BUN/SCr 161/3.20, Lactate 8.0, Troponin 549.8, proBNP [id number]. Fecal occult positive. UA+. CT Chest w/ b/l pleural effusion, no active bleed on CT A/P noncontrast. MICU consulted for further management. GOC discussion held w/ family, opting to make patient DNR/DNI w/ conservative measures (IVF, abx, blood products).   Assessment:  1. ABLA  2. ?UGIB  3. MARIANNA on CKD  4. Lactic Acidosis   5. UTI  6. B/l Pleural Effusion    Plan:  As documented above, family and patient have decided to pursue conservative measures (IVF, abx, blood products if needed) and opted to make patient DNR/DNI w/ no invasive measures or surgery if needed.   Given patient's severely reduced ejection fraction, would transfuse blood products (recommend 2u PRBC) as slowly as possible, +/- diuretics if need be for goal Hgb >8. Family currently states they would like to hold off on blood products at this time given risk of fluid overload state and would like to reevaluate need for blood in early AM. No active bleed on CT A/P (although noncontrast given CKD). Recommend GI evaluation for further recommendations. Protonix BID.   Maintain goal MAP >65. Hemodynamics stable, no tachycardia. Elevated troponin likely 2/2 demand ischemia. Serial EKG/Trops, Cards evaluation. Repeat TTE.   Satting well on RA, goal SpO2 >88%. No acute issues. Pulmonary evaluation.   Renal function currently w/ MARIANNA on CKD, farmer placed w/ robust amount of urine (very very dark appearing). Trend lytes/Scr daily with BMP. I's and O's, goal UOP 0.5 cc/kg/hr. Renal dose meds and avoid nephrotoxins.  UA+, BloodCx/UrineCx sent. Recommend starting empiric abx.   Hold Xarelto.  D/w eICU attending Dr. Chaudhry. At this time, patient is unlikely to gain additional benefit from ICU level of care.Please reconsult should clinical condition change.   Case d/w Hospitalist Dr. Aguilar, bedside nursing staff, as well as patient and family in detail.     TIME SPENT: 45 minutes  Evaluating/treating patient, reviewing data/labs/imaging, discussing case with multidisciplinary team, discussing plan/goals of care with patient/family. Non-inclusive of procedure time.   Patient is a 82y old  Male who presents with a chief complaint of     BRIEF HOSPITAL COURSE:   82-year-old male with history of HTN, HFrEF (EF 20-25% as per family), HLD, CKD3 (baseline SCr ~+2.5 per family) brought in by family for complaints of increasing lower extremity swelling, patient feeling weak with diminished appetite from baseline over the last few days and especially today.  Son also reports patient had black tarry stools when he cleaned him today and yesterday. BMs were small. Unsure of urinary output as pt wears diapers but no hematuria reported.  Patient was discharged from [facility] on 10/28/2022 where he was there for elevated troponin and worsening kidney function.  As per family patient was admitted there for approximately 1 week troponins trended down and kidney function improved patient was discharged home.  For the first few days patient had better appetite and was more alert but again similar symptoms began to develop. Patient is also on Xarelto (did not take yesterday).      In ER, labs significant for Hgb 7.4, BUN/SCr 161/3.20, Lactate 8.0, Troponin 549.8, proBNP [id number]. Fecal occult positive. UA+. CT Chest w/ b/l pleural effusion, no active bleed on CT A/P noncontrast. MICU consulted for further management.  Pt seen and examined at bedside - Lethargic appearing however responsive and AAOx3 (name, , place). Asymptomatic at this time. Afebrile, hemodynamics stable. Satting well on RA.     GOC discussion held w/ patient's son's/HCP and family members, as well as hospitalist Dr. Aguilar at bedside. Family understands poor prognosis given underlying comorbidities (rEF and CKD) and would not want to put patient through any invasive procedures or trauma. They stated they would like to treat w/ conservative measures (IVF, abx, blood products if needed), however if patient were to further decompensate they/he would not want him to be intubated or have CPR performed; they also stated that if he were to need surgical intervention, they know he would most-likely not survive and therefore would not pursue surgery. MOLST form filled out reflecting updated DNR/DNI status.     PAST MEDICAL & SURGICAL HISTORY:  CAD (coronary artery disease)  dx   angioplasty in , angioplasty with 1 stent placed , CABG x 5   HTN (hypertension)  Hyperlipidemia  BPH (Benign Prostatic Hyperplasia)  Kidney disease  Herpes zoster  left eye  resolved  S/P CABG x 5    S/P arthroscopy  right shoulder    Review of Systems:  CONSTITUTIONAL: No fever, chills, or fatigue  EYES: No eye pain, visual disturbances, or discharge  ENMT:  No difficulty hearing, tinnitus, vertigo; No sinus or throat pain  NECK: No pain or stiffness  RESPIRATORY: No cough, wheezing, chills or hemoptysis; No shortness of breath  CARDIOVASCULAR: No chest pain, palpitations, dizziness, or leg swelling  GASTROINTESTINAL: No abdominal or epigastric pain. No nausea, vomiting, or hematemesis; No diarrhea or constipation. No melena or hematochezia.  GENITOURINARY: No dysuria, frequency, hematuria, or incontinence  NEUROLOGICAL: No headaches, memory loss, loss of strength, numbness, or tremors  SKIN: No itching, burning, rashes, or lesions   MUSCULOSKELETAL: No joint pain or swelling; No muscle, back, or extremity pain  PSYCHIATRIC: No depression, anxiety, mood swings, or difficulty sleeping    Medications:    ICU Vital Signs Last 24 Hrs  T(C): --  T(F): --  HR: 79 (2022 21:41) (79 - 79)  BP: 121/89 (2022 21:41) (121/89 - 121/89)  BP(mean): --  ABP: --  ABP(mean): --  RR: --  SpO2: --  O2 Parameters below as of 2022 21:41  Patient On (Oxygen Delivery Method): room air    ABG - ( 2022 00:48 )  pH, Arterial: 7.35  pH, Blood: x     /  pCO2: 27    /  pO2: 117   / HCO3: 15    / Base Excess: -10.7 /  SaO2: 100.0     I&O's Detail    LABS:                      7.4    4.80  )-----------( 213      ( 2022 22:10 )             23.4     11  136  |  101  |  161<H>  ----------------------------<  99  5.0   |  17<L>  |  3.20<H>  Ca    8.7      2022 22:10  TPro  6.6  /  Alb  3.0<L>  /  TBili  0.9  /  DBili  x   /  AST  130<H>  /  ALT  115<H>  /  AlkPhos  61  11-05    CAPILLARY BLOOD GLUCOSE    PT/INR - ( 2022 22:10 )   PT: 25.5 sec;   INR: 2.16 ratio     PTT - ( 2022 22:10 )  PTT:31.8 sec    Urinalysis Basic - ( 2022 23:15)  Color: Yellow / Appearance: Slightly Turbid / S.015 / pH: x  Gluc: x / Ketone: Negative  / Bili: Negative / Urobili: Negative   Blood: x / Protein: 100 / Nitrite: Negative   Leuk Esterase: Trace / RBC: 25-50 /HPF / WBC 3-5   Sq Epi: x / Non Sq Epi: Occasional / Bacteria: Occasional      CULTURES:    Physical Examination:  General: Lethargic appearing, however arousable and mentating appropriately.   HEENT: PERRL.  NECK: Supple.   PULM: Diminished BS at bases b/l.   CVS: s1/s2.  ABD: Soft, nondistended, nontender, normoactive bowel sounds.  EXT: b/l LE edema, nontender.  SKIN: Warm.    RADIOLOGY:   < from: CT Chest No Cont (22 @ 22:31) >  ACC: [id number] EXAM:  CT CHEST                        PROCEDURE DATE:  2022    IMPRESSION:  Cardiomegaly and moderate to large bilateral pleural effusions compatible   with history of CHF.  No pneumonia identified.    < from: CT Abdomen and Pelvis No Cont (22 @ 22:27) >  ACC: [id number] EXAM:  CT ABDOMEN AND PELVIS                        PROCEDURE DATE:  2022    IMPRESSION:  Markedly dilated urinary bladder. This may be related to sequelae of   outlet obstruction from prostate enlargement versus representing sequelae   of urinary retention. Correlate clinically.  Hyperdense material seen within the right renal calyces. This may be   related to blood products, though the possibility of urothelial lesion   cannot be excluded.  Mild left-sided hydronephrosis, likely related to degree of bladder   distention.  Possibility of mild UPJ obstruction not excluded.  Large bilateral pleural effusions and cardiomegaly.        82-year-old male with history of HTN, HFrEF (EF 20-25% as per family), HLD, CKD3 (baseline SCr ~+2.5 per family) brought in by family for complaints of increasing lower extremity swelling, patient feeling weak with diminished appetite from baseline over the last few days and especially today.  Son also reports patient had black tarry stools when he cleaned him today and yesterday. BMs were small. Unsure of urinary output as pt wears diapers but no hematuria reported.  Patient was discharged from [facility] on 10/28/2022 where he was there for elevated troponin and worsening kidney function.  As per family patient was admitted there for approximately 1 week troponins trended down and kidney function improved patient was discharged home.  For the first few days patient had better appetite and was more alert but again similar symptoms began to develop. Patient is also on Xarelto (did not take yesterday).    In ER, labs significant for Hgb 7.4, BUN/SCr 161/3.20, Lactate 8.0, Troponin 549.8, proBNP [id number]. Fecal occult positive. UA+. CT Chest w/ b/l pleural effusion, no active bleed on CT A/P noncontrast. MICU consulted for further management. GOC discussion held w/ family, opting to make patient DNR/DNI w/ conservative measures (IVF, abx, blood products).   Assessment:  1. ABLA  2. ?UGIB  3. MARIANNA on CKD  4. Lactic Acidosis   5. UTI  6. B/l Pleural Effusion  7. Troponinemia    Plan:  As documented above, family and patient have decided to pursue conservative measures (IVF, abx, blood products if needed) and opted to make patient DNR/DNI w/ no invasive measures or surgery if needed.   Given patient's severely reduced ejection fraction, would transfuse blood products (recommend 2u PRBC) as slowly as possible, +/- diuretics if need be for goal Hgb >8. Family currently states they would like to hold off on blood products at this time given risk of fluid overload state and would like to reevaluate need for blood in early AM. No active bleed on CT A/P (although noncontrast given CKD). Recommend GI evaluation for further recommendations. Protonix BID.   Maintain goal MAP >65. Hemodynamics stable, no tachycardia. Elevated troponin likely 2/2 demand ischemia. Serial EKG/Trops, Cards evaluation. Repeat TTE.   Satting well on RA, goal SpO2 >88%. No acute issues. Pulmonary evaluation.   Renal function currently w/ MARIANNA on CKD, farmer placed w/ robust amount of urine (very very dark appearing). Trend lytes/Scr daily with BMP. I's and O's, goal UOP 0.5 cc/kg/hr. Renal dose meds and avoid nephrotoxins. Azotemia likely 2/2 hx of CKD as well as current GIB.   UA+, BloodCx/UrineCx sent. Recommend starting empiric abx.   Hold Xarelto.  D/w eICU attending Dr. Chaudhry. At this time, patient is unlikely to gain additional benefit from ICU level of care.Please reconsult should clinical condition change.   Case d/w Hospitalist Dr. Aguilar, bedside nursing staff, as well as patient and family in detail.     TIME SPENT: 45 minutes  Evaluating/treating patient, reviewing data/labs/imaging, discussing case with multidisciplinary team, discussing plan/goals of care with patient/family. Non-inclusive of procedure time.

## 2022-11-06 NOTE — CONSULT NOTE ADULT - SUBJECTIVE AND OBJECTIVE BOX
Patient is a 82y old  Male who presents with a chief complaint of Lactic acidosis, ABLA (2022 05:25)       HPI:  82-year-old male with history of HTN, HFrEF (EF 20-25% as per family), HLD, CKD3 (baseline SCr ~+2.5 per family) brought in by family for complaints of weakness, dark stools and decreased appetite. Patient feeling weak with diminished appetite from baseline over the last few days and especially today.  Son also reports patient had black tarry stools when he cleaned him today and yesterday. BMs were small. Unsure of urinary output as pt wears diapers but no hematuria reported.  Patient was discharged from [facility] on 10/28/2022 where he was there for elevated troponin and worsening kidney function.  As per family patient was admitted there for approximately 1 week troponins trended down and kidney function improved patient was discharged home.  For the first few days patient had better appetite and was more alert but again similar symptoms began to develop. Patient is also on Xarelto (did not take yesterday).      In ER, labs significant for Hgb 7.4, BUN/SCr 161/3.20, Lactate 8.0, Troponin 549.8, proBNP 006859. Fecal occult positive. UA+. CT Chest w/ b/l pleural effusion, no active bleed on CT A/P noncontrast. MICU consulted- not a candidate for icu at this time    (2022 01:49)    Renal consulted for MARIANNA. Chart reviewed. Patient confused and lethargic. Son at bedside. Patient sees Dr. Bates in office. Not eating 3 days. Was at [facility] 1 week ago with a BUN/Cr of 50/1.6.        PAST MEDICAL & SURGICAL HISTORY:  CAD (coronary artery disease)  dx   angioplasty in , angioplasty with 1 stent placed , CABG x 5       HTN (hypertension)      Hyperlipidemia      BPH (Benign Prostatic Hyperplasia)      Kidney disease      Herpes zoster  left eye  resolved      S/P CABG x 5        S/P arthroscopy  right shoulder           FAMILY HISTORY:  No pertinent family history in first degree relatives    [place]    Social History:Non smoker    MEDICATIONS  (STANDING):  atorvastatin 20 milliGRAM(s) Oral at bedtime  metoprolol succinate ER 50 milliGRAM(s) Oral two times a day  sodium bicarbonate  Infusion 0.054 mEq/kG/Hr (50 mL/Hr) IV Continuous <Continuous>    MEDICATIONS  (PRN):  melatonin 3 milliGRAM(s) Oral at bedtime PRN Insomnia  ondansetron Injectable 4 milliGRAM(s) IV Push every 8 hours PRN Nausea and/or Vomiting   Meds reviewed    Allergies    No Known Allergies    Intolerances         REVIEW OF SYSTEMS:  Unable to assess due to mental status      Vital Signs Last 24 Hrs  T(C): 37.1 (2022 05:51), Max: 37.1 (2022 05:51)  T(F): 98.7 (2022 05:51), Max: 98.7 (2022 05:51)  HR: 58 (2022 05:51) (58 - 79)  BP: 127/66 (2022 05:51) (121/89 - 127/66)  BP(mean): --  RR: 19 (2022 05:51) (19 - 19)  SpO2: 92% (2022 05:51) (92% - 92%)    Parameters below as of 2022 05:51  Patient On (Oxygen Delivery Method): room air      Daily Height in cm: 170.18 (2022 21:41)    Daily     PHYSICAL EXAM:    GENERAL: ill appearing  HEAD:  Atraumatic, Normocephalic  EYES: Conjunctiva and sclera clear  ENMT: dry oral mucosa  NECK: Supple  NERVOUS SYSTEM:  lethargic, confused  CHEST/LUNG: Clear to percussion bilaterally; No rales, rhonchi, wheezing, or rubs  HEART: Regular rate and rhythm; No murmurs, rubs, or gallops  ABDOMEN: Soft, Nontender, Nondistended; Bowel sounds present  EXTREMITIES:  No Edema  SKIN: dry, poor turgor      LABS:                        7.4    4.80  )-----------( 213      ( 2022 22:10 )             23.4     11-05    136  |  101  |  161<H>  ----------------------------<  99  5.0   |  17<L>  |  3.20<H>    Ca    8.7      2022 22:10    TPro  6.6  /  Alb  3.0<L>  /  TBili  0.9  /  DBili  x   /  AST  130<H>  /  ALT  115<H>  /  AlkPhos  61  11-05    PT/INR - ( 2022 22:10 )   PT: 25.5 sec;   INR: 2.16 ratio         PTT - ( 2022 22:10 )  PTT:31.8 sec  Urinalysis Basic - ( 2022 23:15 )    Color: Yellow / Appearance: Slightly Turbid / S.015 / pH: x  Gluc: x / Ketone: Negative  / Bili: Negative / Urobili: Negative   Blood: x / Protein: 100 / Nitrite: Negative   Leuk Esterase: Trace / RBC: 25-50 /HPF / WBC 3-5   Sq Epi: x / Non Sq Epi: Occasional / Bacteria: Occasional        ABG - ( 2022 00:48 )  pH, Arterial: 7.35  pH, Blood: x     /  pCO2: 27    /  pO2: 117   / HCO3: 15    / Base Excess: -10.7 /  SaO2: 100.0                 RADIOLOGY & ADDITIONAL TESTS:

## 2022-11-06 NOTE — H&P ADULT - ASSESSMENT
82-year-old male with history of HTN, HFrEF (EF 20-25% as per family), HLD, CKD3 (baseline SCr ~+2.5 per family) brought in by family for complaints of weakness, dark stools and decreased appetite. Admit with ABLA, GIB, MARIANNA on CKD, lactic acidosis.

## 2022-11-06 NOTE — H&P ADULT - PROBLEM SELECTOR PLAN 2
, Cr 3.20   baseline around 2 per family   likely in the setting of worsening CHF and new ABLA   farmer placed w/ robust amount of urine (very very dark appearing).   Trend lytes/Scr daily with BMP. I's and O's, goal UOP 0.5 cc/kg/hr.  holding home torsemide to allow room for iv diuresis should patient require blood products   avoid nephrotoxic medications   fu am labs  nephro Dr. Oconnell consulted(outpatient nephro is Dr. Bates)

## 2022-11-06 NOTE — H&P ADULT - PROBLEM SELECTOR PLAN 7
hold xarelto in the setting of ABLA and GIB      Family requesting to speak to dr. perry in am for further GOC. patient is appropriate for comfort measures only and hospice at this time. Prognosis poor. Patient at risk of abrupt decompensation.

## 2022-11-06 NOTE — H&P ADULT - TIME BILLING
note written by attending, see above. Extensively discussed with family and patient. BARBI ICU, RN, ED provider

## 2022-11-06 NOTE — PROVIDER CONTACT NOTE (EICU) - BACKGROUND
82M with a history of severe systolic heart failure, EF 25%, CKD came in with GIB, Hb 7.4, however lactate 8.2. CXR showed fluid overloaded.  , ProBNP 150k.  family elected for DNI/DNR status

## 2022-11-06 NOTE — CONSULT NOTE ADULT - CONVERSATION DETAILS
modified palliative - comfort care  ok with home meds  does not want IVF - PRBC - Tests - Labs - Procedures -   prn anxiolytics   prn pain rx regimen

## 2022-11-06 NOTE — H&P ADULT - PROBLEM SELECTOR PLAN 5
lactate 8.2 on admission   bicarbonate 15 on abg   likely 2/2 chf, abla  given bicarbonate in the ed   no ivf given severe chf   fu repeat lactate

## 2022-11-06 NOTE — CONSULT NOTE ADULT - ASSESSMENT
Acute on CKD Stage 3b  Metabolic Acidosis  AMS  Uremia  Urinary Retention  Anemia  Cardiomyopathy      -MARIANNA multifactorial - retention of urine, dehydration, diuretic use, severe anemia  -Check urine indices  -CT a/p reviewed  -Hold diuretic  -Gentle IVF with bicarb x 10 hrs; 500cc  -With B/L pleural effusions; thoracentesis?  -Franco for output  -Check iron panel; will benefit from EPO  -GI eval  -Consider 1u PRBC  -No acute indication for dialysis; however, will monitor closely and arrange if indicated and all parties are in agreement    D/W ER attending and Son at bedside

## 2022-11-06 NOTE — H&P ADULT - PROBLEM SELECTOR PLAN 4
severe systolic HF, EF 25% per family   BNP on admission [id number]  patient with pleural effusions on ct chest (per family were present on admission to [facility] as well)   continue home metoprolol with hold parameters  holding home torsemide to allow for iv diuresis in the event of blood transfusion   strict I&O   daily weights   monitor electrolytes   repeat TTE   avoid IVF   Cardio Dr. Prieto consulted

## 2022-11-11 LAB
CULTURE RESULTS: SIGNIFICANT CHANGE UP
CULTURE RESULTS: SIGNIFICANT CHANGE UP
SPECIMEN SOURCE: SIGNIFICANT CHANGE UP
SPECIMEN SOURCE: SIGNIFICANT CHANGE UP

## 2024-07-22 NOTE — H&P ADULT - PROBLEM SELECTOR PLAN 1
76M w/ CAD (recent stent 6/12/24), Afib, metastatic neuroendocrine pancreatic tumor. Admitted w/ hypotension, course complicated by ABLA and GIB. Undergone EGD #1 on 7/9 which showed uncontrolled bleeding, after which pt developed Afib w/ RVR and hemorrhagic shock, s/p IR for GDA embolization. EGD #2 on 7/12 showed oozing but no active bleeding. Course further complicated by Cdiff colitis. Had another episode of melena and ABLA on 7/18 s/p EGD #3 showed bleeding vessel s/p clipx3 and epi. Pt remains intubated post-procedure. Hgb has been stable for last several days. Continues to have intermittent melenotic stools.     Currently on propofol and following commands, switch to precedex for weaning. Worsening shock state over the weekend likely due to hemorrhagic shock, but now improved and only on decreasing doses of phenylephrine, continue midodrine and stress dose steroids, titrate to MAP >/65-70; afib w/ RVR currently on amiodarone but remains tachycardic, start digoxin load; asa and plavix on hold for significant GIB. Remains intubated post- procedure, no other procedures planned, SBT and plan for extubation today. On vancomycin PO for C diff- last day tomorrow 7/23 for 10 day course; continue meropenam for Klebsiella pneumonia, plan for 7 day course; otherwise all cx NGTD. RAZIA likely prerenal ATN, improving, non-oliguric; HypoK/Mg repleted; continue free water for hyperNa; lasix + metolazone prior to extubation to ensure pt is net negative; tavarez placed by urology - no plan to remove for now. Will need to address oral access once extubated; continue PPI q12; s/p EGD x3 and GDA embolization for multiple GIB, no further interventions planned at this time. ABLA due to GIB, currently 12:3:2; hgb has been stable the last few days, monitor CBC q12. FS w/ ISS coverage. Prognosis guarded. Full code 2/2 GIB   patient presenting with dark tarry stools  Hg 7.4 on arrival   FOBT +  discussed with family the benefits and risks of blood transfusion. Given heart disease transfusion for Hg<8 is recommended however given severe HFrEF and BNP [id number] patient is at high risk of volume overload. Discussed with family that blood can be given slowly with diuresis in between however at this time family requesting to hold blood transfusion at this time. Will recheck in AM and if patient requires transfusion will give 1/2 units as slowly as possible with diuresis in between per family request. Should patient decompensate overnight will discuss with HCP prior to any interventions.   GI consult   NPO except meds